# Patient Record
Sex: MALE | Race: WHITE
[De-identification: names, ages, dates, MRNs, and addresses within clinical notes are randomized per-mention and may not be internally consistent; named-entity substitution may affect disease eponyms.]

---

## 2023-07-05 ENCOUNTER — HOSPITAL ENCOUNTER (OUTPATIENT)
Dept: HOSPITAL 76 - DI | Age: 28
Discharge: HOME | End: 2023-07-05
Attending: FAMILY MEDICINE
Payer: COMMERCIAL

## 2023-07-05 DIAGNOSIS — M25.561: Primary | ICD-10-CM

## 2023-07-05 NOTE — MRI REPORT
PROCEDURE:  KNEE WO - RT

 

INDICATIONS:  KNEE PAIN

 

TECHNIQUE:  

Noncontrast sagittal PD fast spin echo and T2 fast spin echo with fat saturation, sagittal 3-D gradie
nt sequence with fat saturation; coronal T1 spin echo and PD fast spin echo with fat saturation, and 
axial PD fast spin echo with fat saturation through the knee.  

 

COMPARISON:  None.

 

FINDINGS:  

Image quality:  Excellent.  

 

Menisci:  The medial and lateral menisci demonstrate normal morphology and internal signal.  The meni
scal root ligaments appear intact.  

 

Cruciate ligaments:  The anterior and posterior cruciate ligaments appear intact.  

 

Medial structures:  The medial collateral ligament appears intact.  The semimembranosus tendon insert
ions and meniscocapsular junction appear intact.  Visualized portions of the pes anserinus tendons ap
pear normal.  No abnormal bursal fluid.  

 

Lateral structures:  The lateral collateral ligament and the biceps femoris tendon appear intact.  Th
e popliteus tendon appears normal.  Iliotibial band appears normal.  

 

Anterior structures:  The quadriceps and patellar tendons appear intact.  Patellar alignment is patty
l.  No femoral trochlear dysplasia or ventral trochlear prominence. There is edema in the superolater
al aspect of the infrapatellar fat pad.  

 

Bones and cartilage:  No bone marrow contusions or fractures.  The cartilage of the medial and latera
l femorotibial compartments, as well as the patellofemoral compartment, appears normal in thickness. 
 

 

Joint space:  There is physiologic knee joint fluid.  No Baker's cyst.  Normal appearing synovial pli
 are incidentally noted.  

 

IMPRESSION:

 

1. Mild edema in the superolateral aspect of the infrapatellar fat pad, suggesting mild infrapatellar
 fat-pad impingement.

 

Reviewed by: Logan Cee MD on 2023 2:17 PM PDT

Approved by: Logan Cee MD on 2023 2:17 PM PDT

 

 

Station ID:  [id number]